# Patient Record
Sex: FEMALE | Race: WHITE | Employment: PART TIME | URBAN - METROPOLITAN AREA
[De-identification: names, ages, dates, MRNs, and addresses within clinical notes are randomized per-mention and may not be internally consistent; named-entity substitution may affect disease eponyms.]

---

## 2019-03-14 ENCOUNTER — OFFICE VISIT (OUTPATIENT)
Dept: FAMILY MEDICINE CLINIC | Facility: CLINIC | Age: 39
End: 2019-03-14

## 2019-03-14 VITALS
TEMPERATURE: 99 F | DIASTOLIC BLOOD PRESSURE: 78 MMHG | BODY MASS INDEX: 24.91 KG/M2 | HEART RATE: 105 BPM | HEIGHT: 66 IN | WEIGHT: 155 LBS | SYSTOLIC BLOOD PRESSURE: 122 MMHG | OXYGEN SATURATION: 100 % | RESPIRATION RATE: 14 BRPM

## 2019-03-14 DIAGNOSIS — J06.9 VIRAL UPPER RESPIRATORY TRACT INFECTION: ICD-10-CM

## 2019-03-14 DIAGNOSIS — H66.93 ACUTE BILATERAL OTITIS MEDIA: Primary | ICD-10-CM

## 2019-03-14 PROCEDURE — 99202 OFFICE O/P NEW SF 15 MIN: CPT | Performed by: NURSE PRACTITIONER

## 2019-03-14 RX ORDER — AMOXICILLIN 875 MG/1
875 TABLET, COATED ORAL 2 TIMES DAILY
Qty: 20 TABLET | Refills: 0 | Status: SHIPPED | OUTPATIENT
Start: 2019-03-14 | End: 2019-03-24

## 2019-03-14 NOTE — PROGRESS NOTES
CHIEF COMPLAINT:   Patient presents with:  Sore Throat: x 1 week. HPI:   Edilma Stroud is a [id number] [place] old female who presents for upper respiratory symptoms for  1 weeks. Patient reports low grade fever, cough with yellow colored sputum, fatigue.   Giorgi Galdamez Breathing is non labored. Dry cough  CARDIO: RRR without murmur  EXTREMITIES: no cyanosis, clubbing or edema  LYMPH:  Bilateral anterior cervical lymphadenopathy. No posterior cervical lymphadenopathy.        ASSESSMENT AND PLAN:   Federico Harmon is a 44 ye and throat. For example, a cold might lead to an infection of the ear. Ear infections may also occur when you have allergies. The viral infection or allergic reaction can cause swelling of the tube between your ear and throat (the eustachian tube).  The swe in 2 to 3 days. If you are taking an antibiotic and your eardrum has not returned to normal when your provider examines you again, you may need to take a different antibiotic or other medicine.  In this case, it may take another 1 to 2 weeks before your e of inflammation and infection have disappeared. How can I prevent an ear infection from occurring? If you tend to get ear infections often, ask your healthcare provider if you need to be checked for allergies.  Getting treatment for allergies may help p

## 2019-03-14 NOTE — PATIENT INSTRUCTIONS
· It is very important to complete full course of antibiotic.    · Flonase OTC or Fluticasone OTC (signature brand) as packet insert        · Robitussin DM as packet insert   · Acetaminophen or ibuprofen according to package instructions as needed for pain it treated? Antibiotic medicine is a common treatment for ear infections. However, recent studies have shown that the symptoms of ear infections often go away in a couple of days without antibiotics.  Bacteria can become resistant to antibiotics, and the have a fever:   Rest until your temperature has fallen below 100°F (37.8°C). Then become as active as is comfortable. Ask your provider if you can take aspirin, acetaminophen, or ibuprofen to control your fever.  Anyone under the age of 24 with a viral il